# Patient Record
Sex: FEMALE | Race: WHITE | Employment: STUDENT | URBAN - METROPOLITAN AREA
[De-identification: names, ages, dates, MRNs, and addresses within clinical notes are randomized per-mention and may not be internally consistent; named-entity substitution may affect disease eponyms.]

---

## 2017-05-11 ENCOUNTER — OFFICE VISIT (OUTPATIENT)
Dept: FAMILY MEDICINE CLINIC | Facility: CLINIC | Age: 22
End: 2017-05-11

## 2017-05-11 VITALS
BODY MASS INDEX: 29.82 KG/M2 | DIASTOLIC BLOOD PRESSURE: 81 MMHG | RESPIRATION RATE: 16 BRPM | HEART RATE: 78 BPM | HEIGHT: 65 IN | WEIGHT: 179 LBS | SYSTOLIC BLOOD PRESSURE: 128 MMHG | TEMPERATURE: 98 F

## 2017-05-11 DIAGNOSIS — Z00.00 ANNUAL PHYSICAL EXAM: Primary | ICD-10-CM

## 2017-05-11 DIAGNOSIS — M25.551 BILATERAL HIP PAIN: ICD-10-CM

## 2017-05-11 DIAGNOSIS — E66.3 OVERWEIGHT: ICD-10-CM

## 2017-05-11 DIAGNOSIS — M25.552 BILATERAL HIP PAIN: ICD-10-CM

## 2017-05-11 PROCEDURE — 99395 PREV VISIT EST AGE 18-39: CPT | Performed by: FAMILY MEDICINE

## 2017-05-11 PROCEDURE — 90651 9VHPV VACCINE 2/3 DOSE IM: CPT | Performed by: FAMILY MEDICINE

## 2017-05-11 PROCEDURE — 90471 IMMUNIZATION ADMIN: CPT | Performed by: FAMILY MEDICINE

## 2017-05-11 RX ORDER — FLUOCINONIDE 0.5 MG/G
OINTMENT TOPICAL
COMMUNITY
Start: 2013-02-14 | End: 2017-05-11

## 2017-05-11 NOTE — PROGRESS NOTES
HPI:    Patient ID: Kelechi Stout is a 25year old female.   Patient presents with:  Physical  Hip Pain: popping in both hips      HPI  Hx of allergies to multiple allergens, overwgt - no wgt change  siince 2014. , BMI 29.8  Some of allergies incl mosquito b and responsive no acute distress noted, well developed, well nourishhed  Head/Face: normocephalic  Eyes/Vision: pupils are equal, round and reactive to light conjunctiva and lids are normal extraocular motion is intact  Ears/Audiometry: ear canals normal, Physiatry Referral - External    Orders Placed This Encounter  GARDASIL 9    Meds This Visit:    No prescriptions requested or ordered in this encounter       Imaging & Referrals:  PHYSIATRY - EXTERNAL  HPV HUMAN PAPILLOMA VIRUS VACC 9 BRIONNA 3 DOSE IM  XR HI

## 2017-05-15 ENCOUNTER — TELEPHONE (OUTPATIENT)
Dept: FAMILY MEDICINE CLINIC | Facility: CLINIC | Age: 22
End: 2017-05-15

## 2017-05-15 ENCOUNTER — HOSPITAL ENCOUNTER (OUTPATIENT)
Dept: GENERAL RADIOLOGY | Age: 22
Discharge: HOME | End: 2017-05-15
Attending: FAMILY MEDICINE
Payer: COMMERCIAL

## 2017-05-15 DIAGNOSIS — M25.551 BILATERAL HIP PAIN: ICD-10-CM

## 2017-05-15 DIAGNOSIS — M25.552 BILATERAL HIP PAIN: ICD-10-CM

## 2017-05-15 PROCEDURE — 73522 X-RAY EXAM HIPS BI 3-4 VIEWS: CPT | Performed by: FAMILY MEDICINE

## 2017-05-15 NOTE — TELEPHONE ENCOUNTER
Addendum:  She was already given the number for physical med via [address] [place] [address] first, and if no success, can then try Dr Courtney Hernandez, see prev message

## 2017-05-16 NOTE — TELEPHONE ENCOUNTER
Notes Recorded by Rene Argueta MD on 5/15/2017 at 5:10 PM  Hip xrays did not reveal any significant hip abn's. Her symptoms of popping and pain need to be evaluated.  I would suggest that she see orthopedist - Dr. James Byrd, [facility], tel

## 2017-06-12 ENCOUNTER — NURSE ONLY (OUTPATIENT)
Dept: FAMILY MEDICINE CLINIC | Facility: CLINIC | Age: 22
End: 2017-06-12

## 2017-06-12 PROCEDURE — 90471 IMMUNIZATION ADMIN: CPT | Performed by: FAMILY MEDICINE

## 2017-06-12 PROCEDURE — 90651 9VHPV VACCINE 2/3 DOSE IM: CPT | Performed by: FAMILY MEDICINE

## 2017-11-25 ENCOUNTER — NURSE ONLY (OUTPATIENT)
Dept: FAMILY MEDICINE CLINIC | Facility: CLINIC | Age: 22
End: 2017-11-25

## 2017-11-25 DIAGNOSIS — Z23 NEED FOR HPV VACCINATION: Primary | ICD-10-CM

## 2017-11-25 PROCEDURE — 90651 9VHPV VACCINE 2/3 DOSE IM: CPT | Performed by: FAMILY MEDICINE

## 2017-11-25 PROCEDURE — 90471 IMMUNIZATION ADMIN: CPT | Performed by: FAMILY MEDICINE

## 2017-11-25 NOTE — PROGRESS NOTES
Pt presented for 3rd HPV vaccine. With timeframe for next dose. Observed for 15 mins. , no s/s of adverse reactions. No injection site reactions seen. Pt verbally states she feels fine. No further action needed.

## (undated) NOTE — MR AVS SNAPSHOT
Formerly named [facility]  [address], [address]  [phone]               Thank you for choosing us for your health care visit with Melida Way MD.  We are glad to serve you and happy to provide you with this summar you have any questions related to insurance coverage. Thank you.          Referral Details     Referred By    Referred To    Emili Gonzáles MD   [address] [address]   Phone:  [phone]   Fax:  [phone]    Diagnoses:  Bilateral hi [employer] will allow you to access patient instructions from your recent visit,  view other health information, and more. To sign up or find more information, go to [email or web address] and click on the Sign Up Now link in the [employer] box.      Enter 2 ½ hours per week – spread out over time Use a chalino to keep you motivated   Don’t forget strength training with weights and resistance Set goals and track your progress   You don’t need to join a gym. Home exercises work great.  Put more priority on exe